# Patient Record
Sex: MALE | Race: OTHER | HISPANIC OR LATINO | Employment: UNEMPLOYED | URBAN - METROPOLITAN AREA
[De-identification: names, ages, dates, MRNs, and addresses within clinical notes are randomized per-mention and may not be internally consistent; named-entity substitution may affect disease eponyms.]

---

## 2021-11-02 ENCOUNTER — APPOINTMENT (EMERGENCY)
Dept: RADIOLOGY | Facility: HOSPITAL | Age: 36
End: 2021-11-02
Payer: COMMERCIAL

## 2021-11-02 ENCOUNTER — HOSPITAL ENCOUNTER (EMERGENCY)
Facility: HOSPITAL | Age: 36
Discharge: HOME | End: 2021-11-02
Attending: EMERGENCY MEDICINE
Payer: COMMERCIAL

## 2021-11-02 VITALS
SYSTOLIC BLOOD PRESSURE: 123 MMHG | HEART RATE: 74 BPM | OXYGEN SATURATION: 99 % | WEIGHT: 136.47 LBS | TEMPERATURE: 97.4 F | RESPIRATION RATE: 18 BRPM | DIASTOLIC BLOOD PRESSURE: 80 MMHG

## 2021-11-02 DIAGNOSIS — J45.901 ASTHMA EXACERBATION: Primary | ICD-10-CM

## 2021-11-02 DIAGNOSIS — J40 BRONCHITIS: ICD-10-CM

## 2021-11-02 LAB
ALBUMIN SERPL BCP-MCNC: 4.4 G/DL (ref 3–5.2)
ALP SERPL-CCNC: 52 U/L (ref 43–122)
ALT SERPL W P-5'-P-CCNC: 17 U/L
ANION GAP SERPL CALCULATED.3IONS-SCNC: 4 MMOL/L (ref 5–14)
AST SERPL W P-5'-P-CCNC: 28 U/L (ref 17–59)
ATRIAL RATE: 61 BPM
BASOPHILS # BLD AUTO: 0 THOUSANDS/ΜL (ref 0–0.1)
BASOPHILS NFR BLD AUTO: 1 % (ref 0–1)
BILIRUB SERPL-MCNC: 0.31 MG/DL
BUN SERPL-MCNC: 12 MG/DL (ref 5–25)
CALCIUM SERPL-MCNC: 9.9 MG/DL (ref 8.4–10.2)
CHLORIDE SERPL-SCNC: 103 MMOL/L (ref 97–108)
CO2 SERPL-SCNC: 31 MMOL/L (ref 22–30)
CREAT SERPL-MCNC: 0.88 MG/DL (ref 0.7–1.5)
EOSINOPHIL # BLD AUTO: 0.5 THOUSAND/ΜL (ref 0–0.4)
EOSINOPHIL NFR BLD AUTO: 7 % (ref 0–6)
ERYTHROCYTE [DISTWIDTH] IN BLOOD BY AUTOMATED COUNT: 12.6 %
FLUAV RNA RESP QL NAA+PROBE: NEGATIVE
FLUBV RNA RESP QL NAA+PROBE: NEGATIVE
GFR SERPL CREATININE-BSD FRML MDRD: 109 ML/MIN/1.73SQ M
GLUCOSE SERPL-MCNC: 89 MG/DL (ref 70–99)
HCT VFR BLD AUTO: 42 % (ref 41–53)
HGB BLD-MCNC: 14.3 G/DL (ref 13.5–17.5)
LIPASE SERPL-CCNC: 58 U/L (ref 23–300)
LYMPHOCYTES # BLD AUTO: 0.8 THOUSANDS/ΜL (ref 0.5–4)
LYMPHOCYTES NFR BLD AUTO: 12 % (ref 25–45)
MACROCYTES BLD QL AUTO: PRESENT
MCH RBC QN AUTO: 34.8 PG (ref 26–34)
MCHC RBC AUTO-ENTMCNC: 34.1 G/DL (ref 31–36)
MCV RBC AUTO: 102 FL (ref 80–100)
MONOCYTES # BLD AUTO: 0.6 THOUSAND/ΜL (ref 0.2–0.9)
MONOCYTES NFR BLD AUTO: 9 % (ref 1–10)
NEUTROPHILS # BLD AUTO: 5.2 THOUSANDS/ΜL (ref 1.8–7.8)
NEUTS SEG NFR BLD AUTO: 72 % (ref 45–65)
P AXIS: 31 DEGREES
PLATELET # BLD AUTO: 187 THOUSANDS/UL (ref 150–450)
PLATELET BLD QL SMEAR: ADEQUATE
PMV BLD AUTO: 8.7 FL (ref 8.9–12.7)
POTASSIUM SERPL-SCNC: 4 MMOL/L (ref 3.6–5)
PR INTERVAL: 148 MS
PROT SERPL-MCNC: 7.5 G/DL (ref 5.9–8.4)
QRS AXIS: -10 DEGREES
QRSD INTERVAL: 84 MS
QT INTERVAL: 362 MS
QTC INTERVAL: 364 MS
RBC # BLD AUTO: 4.12 MILLION/UL (ref 4.5–5.9)
RBC MORPH BLD: NORMAL
RSV RNA RESP QL NAA+PROBE: NEGATIVE
SARS-COV-2 RNA RESP QL NAA+PROBE: NEGATIVE
SODIUM SERPL-SCNC: 138 MMOL/L (ref 137–147)
T WAVE AXIS: 37 DEGREES
TROPONIN I SERPL-MCNC: <0.01 NG/ML (ref 0–0.03)
VENTRICULAR RATE: 61 BPM
WBC # BLD AUTO: 7.2 THOUSAND/UL (ref 4.5–11)

## 2021-11-02 PROCEDURE — 93005 ELECTROCARDIOGRAM TRACING: CPT

## 2021-11-02 PROCEDURE — 94640 AIRWAY INHALATION TREATMENT: CPT

## 2021-11-02 PROCEDURE — 80053 COMPREHEN METABOLIC PANEL: CPT | Performed by: PHYSICIAN ASSISTANT

## 2021-11-02 PROCEDURE — 36415 COLL VENOUS BLD VENIPUNCTURE: CPT | Performed by: PHYSICIAN ASSISTANT

## 2021-11-02 PROCEDURE — 84484 ASSAY OF TROPONIN QUANT: CPT | Performed by: PHYSICIAN ASSISTANT

## 2021-11-02 PROCEDURE — 83690 ASSAY OF LIPASE: CPT | Performed by: PHYSICIAN ASSISTANT

## 2021-11-02 PROCEDURE — 85025 COMPLETE CBC W/AUTO DIFF WBC: CPT | Performed by: PHYSICIAN ASSISTANT

## 2021-11-02 PROCEDURE — 96360 HYDRATION IV INFUSION INIT: CPT

## 2021-11-02 PROCEDURE — 99284 EMERGENCY DEPT VISIT MOD MDM: CPT

## 2021-11-02 PROCEDURE — 0241U HB NFCT DS VIR RESP RNA 4 TRGT: CPT | Performed by: PHYSICIAN ASSISTANT

## 2021-11-02 PROCEDURE — 93010 ELECTROCARDIOGRAM REPORT: CPT | Performed by: INTERNAL MEDICINE

## 2021-11-02 PROCEDURE — 71045 X-RAY EXAM CHEST 1 VIEW: CPT

## 2021-11-02 PROCEDURE — 99284 EMERGENCY DEPT VISIT MOD MDM: CPT | Performed by: PHYSICIAN ASSISTANT

## 2021-11-02 PROCEDURE — 96361 HYDRATE IV INFUSION ADD-ON: CPT

## 2021-11-02 RX ORDER — ALBUTEROL SULFATE 90 UG/1
2 AEROSOL, METERED RESPIRATORY (INHALATION) EVERY 6 HOURS PRN
Qty: 8.5 G | Refills: 0 | Status: SHIPPED | OUTPATIENT
Start: 2021-11-02

## 2021-11-02 RX ORDER — ALBUTEROL SULFATE 2.5 MG/3ML
2.5 SOLUTION RESPIRATORY (INHALATION) ONCE
Status: COMPLETED | OUTPATIENT
Start: 2021-11-02 | End: 2021-11-02

## 2021-11-02 RX ORDER — SODIUM CHLORIDE 9 MG/ML
250 INJECTION, SOLUTION INTRAVENOUS CONTINUOUS
Status: DISCONTINUED | OUTPATIENT
Start: 2021-11-02 | End: 2021-11-02 | Stop reason: HOSPADM

## 2021-11-02 RX ORDER — AZITHROMYCIN 250 MG/1
TABLET, FILM COATED ORAL
Qty: 6 TABLET | Refills: 0 | Status: SHIPPED | OUTPATIENT
Start: 2021-11-02 | End: 2021-11-06

## 2021-11-02 RX ORDER — PREDNISONE 10 MG/1
TABLET ORAL
Qty: 30 TABLET | Refills: 0 | Status: SHIPPED | OUTPATIENT
Start: 2021-11-02

## 2021-11-02 RX ORDER — PREDNISONE 20 MG/1
60 TABLET ORAL ONCE
Status: COMPLETED | OUTPATIENT
Start: 2021-11-02 | End: 2021-11-02

## 2021-11-02 RX ORDER — ALBUTEROL SULFATE 2.5 MG/3ML
2.5 SOLUTION RESPIRATORY (INHALATION) EVERY 6 HOURS PRN
Qty: 75 ML | Refills: 0 | Status: SHIPPED | OUTPATIENT
Start: 2021-11-02

## 2021-11-02 RX ADMIN — PREDNISONE 60 MG: 20 TABLET ORAL at 18:02

## 2021-11-02 RX ADMIN — IPRATROPIUM BROMIDE 0.5 MG: 0.5 SOLUTION RESPIRATORY (INHALATION) at 16:22

## 2021-11-02 RX ADMIN — ALBUTEROL SULFATE 2.5 MG: 2.5 SOLUTION RESPIRATORY (INHALATION) at 16:22

## 2021-11-02 RX ADMIN — SODIUM CHLORIDE 250 ML/HR: 0.9 INJECTION, SOLUTION INTRAVENOUS at 16:20

## 2024-06-17 ENCOUNTER — HOSPITAL ENCOUNTER (EMERGENCY)
Facility: HOSPITAL | Age: 39
Discharge: HOME | End: 2024-06-17
Attending: INTERNAL MEDICINE | Admitting: INTERNAL MEDICINE
Payer: COMMERCIAL

## 2024-06-17 VITALS
RESPIRATION RATE: 16 BRPM | DIASTOLIC BLOOD PRESSURE: 74 MMHG | TEMPERATURE: 98.8 F | WEIGHT: 138.7 LBS | SYSTOLIC BLOOD PRESSURE: 128 MMHG | OXYGEN SATURATION: 97 % | HEART RATE: 69 BPM

## 2024-06-17 DIAGNOSIS — R19.7 NAUSEA VOMITING AND DIARRHEA: Primary | ICD-10-CM

## 2024-06-17 DIAGNOSIS — R11.2 NAUSEA VOMITING AND DIARRHEA: Primary | ICD-10-CM

## 2024-06-17 PROCEDURE — 99284 EMERGENCY DEPT VISIT MOD MDM: CPT | Performed by: PHYSICIAN ASSISTANT

## 2024-06-17 PROCEDURE — 99283 EMERGENCY DEPT VISIT LOW MDM: CPT

## 2024-06-17 RX ORDER — ONDANSETRON 4 MG/1
4 TABLET, ORALLY DISINTEGRATING ORAL EVERY 8 HOURS PRN
Qty: 20 TABLET | Refills: 0 | Status: SHIPPED | OUTPATIENT
Start: 2024-06-17 | End: 2024-06-27

## 2024-06-17 RX ORDER — SIMETHICONE 125 MG
125 TABLET,CHEWABLE ORAL EVERY 6 HOURS PRN
Qty: 30 TABLET | Refills: 0 | Status: SHIPPED | OUTPATIENT
Start: 2024-06-17

## 2024-06-17 NOTE — ED PROVIDER NOTES
"History  Chief Complaint   Patient presents with    Vomiting     39-year-old male presenting for complaints of nausea vomiting and diarrhea ongoing for the past 4 to 5 days.  Patient denies any acute abdominal pain however reports he is having \"gurgling\" sensations in his upper abdominal area.  He denies abdominal surgical history.  He denies blood in the vomit or diarrhea.  He denies fevers.  He denies chest pain coughing congestion or shortness of breath.  He reports he would like a work note as he was unable to attend work due to the symptoms.  He denies ill contacts or suspicious food intake.  He reports has been able to tolerate p.o. intake and is drinking fluids and urinating as per normal.      Vomiting  Associated symptoms: diarrhea    Associated symptoms: no abdominal pain, no chills, no fever and no sore throat        Prior to Admission Medications   Prescriptions Last Dose Informant Patient Reported? Taking?   albuterol (2.5 mg/3 mL) 0.083 % nebulizer solution   No No   Sig: Take 3 mL (2.5 mg total) by nebulization every 6 (six) hours as needed for wheezing or shortness of breath   albuterol (ProAir HFA) 90 mcg/act inhaler   No No   Sig: Inhale 2 puffs every 6 (six) hours as needed for wheezing   predniSONE 10 mg tablet   No No   Si tabs po qd x 2 days then 4 tabs po qd x 2 days then 3 tabs po qd x 2 days then 2 tabs po qd x 2 days then 1 tab po qd x 2 days      Facility-Administered Medications: None       Past Medical History:   Diagnosis Date    Asthma        History reviewed. No pertinent surgical history.    History reviewed. No pertinent family history.  I have reviewed and agree with the history as documented.    E-Cigarette/Vaping     E-Cigarette/Vaping Substances     Social History     Tobacco Use    Smoking status: Every Day     Types: Cigarettes    Smokeless tobacco: Never   Substance Use Topics    Alcohol use: Not Currently     Comment: occ.    Drug use: Never       Review of Systems "   Constitutional:  Negative for chills, fatigue and fever.   HENT:  Negative for congestion, ear pain, rhinorrhea and sore throat.    Eyes:  Negative for redness.   Respiratory:  Negative for chest tightness and shortness of breath.    Cardiovascular:  Negative for chest pain and palpitations.   Gastrointestinal:  Positive for diarrhea, nausea and vomiting. Negative for abdominal pain.   Genitourinary:  Negative for dysuria and hematuria.   Musculoskeletal: Negative.    Skin:  Negative for rash.   Neurological:  Negative for dizziness, syncope, light-headedness and numbness.       Physical Exam  Physical Exam  Vitals and nursing note reviewed.   Constitutional:       Appearance: Normal appearance. He is well-developed.   HENT:      Head: Normocephalic and atraumatic.   Eyes:      General: No scleral icterus.     Pupils: Pupils are equal, round, and reactive to light.   Cardiovascular:      Rate and Rhythm: Normal rate and regular rhythm.      Pulses: Normal pulses.   Pulmonary:      Effort: Pulmonary effort is normal. No respiratory distress.      Breath sounds: No stridor.   Abdominal:      General: There is no distension.      Palpations: There is no mass.      Comments: Benign abdominal exam. Normal bowel sounds auscultated in all 4 quadrants. No tenderness to palpation, both light and deep in all 4 quadrants.   Musculoskeletal:      Cervical back: Normal range of motion.   Skin:     General: Skin is warm and dry.      Capillary Refill: Capillary refill takes less than 2 seconds.      Coloration: Skin is not jaundiced.   Neurological:      Mental Status: He is alert and oriented to person, place, and time.      Gait: Gait normal.   Psychiatric:         Mood and Affect: Mood normal.         Vital Signs  ED Triage Vitals [06/17/24 0953]   Temperature Pulse Respirations Blood Pressure SpO2   98.8 °F (37.1 °C) 69 16 128/74 97 %      Temp Source Heart Rate Source Patient Position - Orthostatic VS BP Location FiO2 (%)  "  Oral Monitor Sitting Left arm --      Pain Score       --           Vitals:    06/17/24 0953   BP: 128/74   Pulse: 69   Patient Position - Orthostatic VS: Sitting         Visual Acuity      ED Medications  Medications - No data to display    Diagnostic Studies  Results Reviewed       None                   No orders to display              Procedures  Procedures         ED Course                                             Medical Decision Making  39-year-old male presenting for evaluation of nausea vomiting diarrhea ongoing for the past 4 to 5 days no fevers, chills, blood in the vomit or diarrhea, acute abdominal pain or other significant red flag signs or symptoms, low suspicion for acute intra-abdominal surgical emergency such as appendicitis, diverticulitis, perforated bowel given entirely benign abdominal exam and normal vital signs, patient is tolerating p.o. intake drinking fluids and hydrating appropriately with no urinary symptoms patient educated on supportive therapy and given work note as requested.  Simethicone and Zofran prescribed for symptomatic use patient advised the diarrhea may continue and that no medications will be given to stop this.    Strict return to ED precautions discussed. Patient and/or family members verbalizes understanding and agrees with plan. Patient is stable for discharge     Portions of the record may have been created with voice recognition software. Occasional wrong word or \"sound a like\" substitutions may have occurred due to the inherent limitations of voice recognition software. Read the chart carefully and recognize, using context, where substitutions have occurred.    Risk  OTC drugs.  Prescription drug management.             Disposition  Final diagnoses:   Nausea vomiting and diarrhea     Time reflects when diagnosis was documented in both MDM as applicable and the Disposition within this note       Time User Action Codes Description Comment    6/17/2024 10:31 AM " Jamaica Rodriguez Add [R11.2,  R19.7] Nausea vomiting and diarrhea           ED Disposition       ED Disposition   Discharge    Condition   Good    Date/Time   Mon Jun 17, 2024 10:31 AM    Comment   Shade Alvarado discharge to home/self care.                   Follow-up Information       Follow up With Specialties Details Why Contact Info    Reinaldo Broderick MD Family Medicine Schedule an appointment as soon as possible for a visit in 2 days As needed [address], [address]  [address] [place] [address] [place] [address] [phone]              Discharge Medication List as of 6/17/2024 10:32 AM        START taking these medications    Details   ondansetron (ZOFRAN-ODT) 4 mg disintegrating tablet Take 1 tablet (4 mg total) by mouth every 8 (eight) hours as needed for nausea for up to 10 days, Starting Mon 6/17/2024, Until Thu 6/27/2024 at 2359, Normal      simethicone (MYLICON) 125 MG chewable tablet Chew 1 tablet (125 mg total) every 6 (six) hours as needed for flatulence, Starting Mon 6/17/2024, Normal           CONTINUE these medications which have NOT CHANGED    Details   albuterol (2.5 mg/3 mL) 0.083 % nebulizer solution Take 3 mL (2.5 mg total) by nebulization every 6 (six) hours as needed for wheezing or shortness of breath, Starting Tue 11/2/2021, Print      albuterol (ProAir HFA) 90 mcg/act inhaler Inhale 2 puffs every 6 (six) hours as needed for wheezing, Starting Tue 11/2/2021, Print      predniSONE 10 mg tablet 5 tabs po qd x 2 days then 4 tabs po qd x 2 days then 3 tabs po qd x 2 days then 2 tabs po qd x 2 days then 1 tab po qd x 2 days, Print             No discharge procedures on file.    PDMP Review       None            ED Provider  Electronically Signed by             Jamaica Rodriguez PA-C  06/17/24 7848

## 2024-06-17 NOTE — Clinical Note
Shade Alvarado was seen and treated in our emergency department on 6/17/2024.                Diagnosis:     Shade  may return to work on return date.    He may return on this date: 06/18/2024         If you have any questions or concerns, please don't hesitate to call.      Jamaica Rodriguez PA-C    ______________________________           _______________          _______________  Hospital Representative                              Date                                Time